# Patient Record
Sex: FEMALE | Race: WHITE | NOT HISPANIC OR LATINO | URBAN - NONMETROPOLITAN AREA
[De-identification: names, ages, dates, MRNs, and addresses within clinical notes are randomized per-mention and may not be internally consistent; named-entity substitution may affect disease eponyms.]

---

## 2021-01-25 ENCOUNTER — WEB ENCOUNTER (OUTPATIENT)
Dept: URBAN - NONMETROPOLITAN AREA CLINIC 2 | Facility: CLINIC | Age: 51
End: 2021-01-25

## 2021-01-25 ENCOUNTER — LAB OUTSIDE AN ENCOUNTER (OUTPATIENT)
Dept: URBAN - NONMETROPOLITAN AREA CLINIC 2 | Facility: CLINIC | Age: 51
End: 2021-01-25

## 2021-01-25 ENCOUNTER — OFFICE VISIT (OUTPATIENT)
Dept: URBAN - NONMETROPOLITAN AREA CLINIC 2 | Facility: CLINIC | Age: 51
End: 2021-01-25
Payer: MEDICARE

## 2021-01-25 DIAGNOSIS — R13.10 ESOPHAGEAL DYSPHAGIA: ICD-10-CM

## 2021-01-25 DIAGNOSIS — K59.00 CONSTIPATION, UNSPECIFIED CONSTIPATION TYPE: ICD-10-CM

## 2021-01-25 DIAGNOSIS — Z12.11 COLON CANCER SCREENING: ICD-10-CM

## 2021-01-25 DIAGNOSIS — K21.9 GERD WITHOUT ESOPHAGITIS: ICD-10-CM

## 2021-01-25 PROCEDURE — 3017F COLORECTAL CA SCREEN DOC REV: CPT | Performed by: NURSE PRACTITIONER

## 2021-01-25 PROCEDURE — G8420 CALC BMI NORM PARAMETERS: HCPCS | Performed by: NURSE PRACTITIONER

## 2021-01-25 PROCEDURE — 99204 OFFICE O/P NEW MOD 45 MIN: CPT | Performed by: NURSE PRACTITIONER

## 2021-01-25 PROCEDURE — G8427 DOCREV CUR MEDS BY ELIG CLIN: HCPCS | Performed by: NURSE PRACTITIONER

## 2021-01-25 RX ORDER — FAMOTIDINE 40 MG/1
1 TABLET AT BEDTIME AS NEEDED TABLET, FILM COATED ORAL ONCE A DAY
Status: ACTIVE | COMMUNITY

## 2021-01-25 RX ORDER — LAMOTRIGINE 25 MG/1
2 TABLET TABLET ORAL BID
Status: ACTIVE | COMMUNITY

## 2021-01-25 RX ORDER — GALCANEZUMAB 120 MG/ML
AS DIRECTED INJECTION, SOLUTION SUBCUTANEOUS
Status: ACTIVE | COMMUNITY

## 2021-01-25 RX ORDER — FLUOXETINE HYDROCHLORIDE 40 MG/1
TAKE 1 CAPSULE (40 MG) BY ORAL ROUTE 2 TIMES PER DAY IN THE MORNING AND AT NOON CAPSULE ORAL 2
Qty: 0 | Refills: 0 | Status: DISCONTINUED | COMMUNITY
Start: 1900-01-01

## 2021-01-25 RX ORDER — ALPRAZOLAM 2 MG/1
TAKE 1 TABLET (2 MG) BY ORAL ROUTE 2 TIMES PER DAY TABLET ORAL
Qty: 0 | Refills: 0 | Status: DISCONTINUED | COMMUNITY
Start: 1900-01-01

## 2021-01-25 RX ORDER — ATORVASTATIN CALCIUM 10 MG/1
1 TABLET TABLET, FILM COATED ORAL ONCE A DAY
Status: ACTIVE | COMMUNITY

## 2021-01-25 RX ORDER — LEVETIRACETAM 500 MG/1
1 TABLET TABLET, FILM COATED ORAL
Status: ACTIVE | COMMUNITY

## 2021-01-25 RX ORDER — VENLAFAXINE HCL 37.5 MG
TAKE 1 CAPSULE (37.5 MG) BY ORAL ROUTE ONCE DAILY WITH FOOD CAPSULE, EXT RELEASE 24 HR ORAL 1
Qty: 0 | Refills: 0 | Status: DISCONTINUED | COMMUNITY
Start: 1900-01-01

## 2021-01-25 RX ORDER — OMEPRAZOLE 40 MG/1
1 CAPSULE 30 MINUTES BEFORE MEALS CAPSULE, DELAYED RELEASE ORAL BID
Qty: 180 | Refills: 11 | OUTPATIENT
Start: 2021-01-25

## 2021-01-25 RX ORDER — ZOLPIDEM TARTRATE 10 MG/1
1 TABLET AT BEDTIME AS NEEDED TABLET, FILM COATED ORAL ONCE A DAY
Status: ACTIVE | COMMUNITY

## 2021-01-25 RX ORDER — LOSARTAN POTASSIUM 25 MG/1
TAKE 1 TABLET (25 MG) BY ORAL ROUTE ONCE DAILY TABLET, FILM COATED ORAL 1
Qty: 0 | Refills: 0 | Status: DISCONTINUED | COMMUNITY
Start: 1900-01-01

## 2021-01-25 RX ORDER — GABAPENTIN 300 MG/1
TAKE 1 CAPSULE (300 MG) BY ORAL ROUTE 3 TIMES PER DAY CAPSULE ORAL
Qty: 0 | Refills: 0 | Status: DISCONTINUED | COMMUNITY
Start: 1900-01-01

## 2021-01-25 RX ORDER — ZONISAMIDE 25 MG/1
3 CAPSULE CAPSULE ORAL QHS
Status: ACTIVE | COMMUNITY

## 2021-01-25 RX ORDER — CLONAZEPAM 1 MG/1
TAKE 1 TABLET (1 MG) BY ORAL ROUTE 2 TIMES PER DAY(1 OR 2) TABLET ORAL 2
Qty: 0 | Refills: 0 | Status: ACTIVE | COMMUNITY
Start: 1900-01-01

## 2021-01-25 NOTE — HPI-TODAY'S VISIT:
1/25/2021 Ms. Mishel Baca is a 50 year old female here for dysphagia. She was last seen in 2016 for constipation with a normal colonoscopy. She was given linzess at the time. She was unable to get this due to cost. She has been taking stool softners with continued symptoms. She has tried all OTC meds which either do not work or cause cramping. Today, she is complaining of dysphagia. This is been occuring for the last 6 months. She will have issues with pills, solids and liquids. She is able to clear the bolus with more liquid and time. She has never had an EGD- but reports a hx of bleeding ulcers in her 20's.. She has been having a lot of reflux which she atrributes to her weight gain. SHe takes tums and pepcid with continued symptoms.  CS

## 2021-02-22 ENCOUNTER — CLAIMS CREATED FROM THE CLAIM WINDOW (OUTPATIENT)
Dept: URBAN - METROPOLITAN AREA CLINIC 4 | Facility: CLINIC | Age: 51
End: 2021-02-22
Payer: MEDICARE

## 2021-02-22 ENCOUNTER — OFFICE VISIT (OUTPATIENT)
Dept: URBAN - NONMETROPOLITAN AREA SURGERY CENTER 1 | Facility: SURGERY CENTER | Age: 51
End: 2021-02-22
Payer: MEDICARE

## 2021-02-22 DIAGNOSIS — K21.00 GASTRO-ESOPHAGEAL REFLUX DISEASE WITH ESOPHAGITIS, WITHOUT BLEEDING: ICD-10-CM

## 2021-02-22 DIAGNOSIS — B96.81 BACTERIAL INFECTION DUE TO H. PYLORI: ICD-10-CM

## 2021-02-22 DIAGNOSIS — K29.60 OTHER GASTRITIS WITHOUT BLEEDING: ICD-10-CM

## 2021-02-22 DIAGNOSIS — K21.9 ACID REFLUX: ICD-10-CM

## 2021-02-22 DIAGNOSIS — B96.81 HELICOBACTER PYLORI [H. PYLORI] AS THE CAUSE OF DISEASES CLASSIFIED ELSEWHERE: ICD-10-CM

## 2021-02-22 DIAGNOSIS — K29.60 ADENOPAPILLOMATOSIS GASTRICA: ICD-10-CM

## 2021-02-22 PROCEDURE — G8907 PT DOC NO EVENTS ON DISCHARG: HCPCS | Performed by: INTERNAL MEDICINE

## 2021-02-22 PROCEDURE — 88312 SPECIAL STAINS GROUP 1: CPT | Performed by: PATHOLOGY

## 2021-02-22 PROCEDURE — 43239 EGD BIOPSY SINGLE/MULTIPLE: CPT | Performed by: INTERNAL MEDICINE

## 2021-02-22 PROCEDURE — 88342 IMHCHEM/IMCYTCHM 1ST ANTB: CPT | Performed by: PATHOLOGY

## 2021-02-22 PROCEDURE — 88305 TISSUE EXAM BY PATHOLOGIST: CPT | Performed by: PATHOLOGY

## 2021-03-15 ENCOUNTER — OFFICE VISIT (OUTPATIENT)
Dept: URBAN - NONMETROPOLITAN AREA CLINIC 2 | Facility: CLINIC | Age: 51
End: 2021-03-15
Payer: MEDICARE

## 2021-03-15 VITALS
BODY MASS INDEX: 40.02 KG/M2 | DIASTOLIC BLOOD PRESSURE: 82 MMHG | SYSTOLIC BLOOD PRESSURE: 123 MMHG | HEART RATE: 67 BPM | TEMPERATURE: 97.7 F | HEIGHT: 66 IN | WEIGHT: 249 LBS

## 2021-03-15 DIAGNOSIS — R13.10 ESOPHAGEAL DYSPHAGIA: ICD-10-CM

## 2021-03-15 DIAGNOSIS — K59.00 CONSTIPATION, UNSPECIFIED CONSTIPATION TYPE: ICD-10-CM

## 2021-03-15 DIAGNOSIS — A04.8 H. PYLORI INFECTION: ICD-10-CM

## 2021-03-15 DIAGNOSIS — Z12.11 COLON CANCER SCREENING: ICD-10-CM

## 2021-03-15 DIAGNOSIS — K21.9 GERD WITHOUT ESOPHAGITIS: ICD-10-CM

## 2021-03-15 PROCEDURE — 99213 OFFICE O/P EST LOW 20 MIN: CPT | Performed by: NURSE PRACTITIONER

## 2021-03-15 RX ORDER — LAMOTRIGINE 25 MG/1
2 TABLET TABLET ORAL BID
Status: ACTIVE | COMMUNITY

## 2021-03-15 RX ORDER — LEVETIRACETAM 500 MG/1
1 TABLET TABLET, FILM COATED ORAL
Status: ACTIVE | COMMUNITY

## 2021-03-15 RX ORDER — ZOLPIDEM TARTRATE 10 MG/1
1 TABLET AT BEDTIME AS NEEDED TABLET, FILM COATED ORAL ONCE A DAY
Status: ACTIVE | COMMUNITY

## 2021-03-15 RX ORDER — CLONAZEPAM 1 MG/1
TAKE 1 TABLET (1 MG) BY ORAL ROUTE 2 TIMES PER DAY(1 OR 2) TABLET ORAL 2
Qty: 0 | Refills: 0 | Status: ACTIVE | COMMUNITY
Start: 1900-01-01

## 2021-03-15 RX ORDER — GALCANEZUMAB 120 MG/ML
AS DIRECTED INJECTION, SOLUTION SUBCUTANEOUS
Status: ACTIVE | COMMUNITY

## 2021-03-15 RX ORDER — PLECANATIDE 3 MG/1
1 TABLET TABLET ORAL ONCE A DAY
Qty: 90 | Refills: 3 | OUTPATIENT
Start: 2021-03-15 | End: 2022-03-09

## 2021-03-15 RX ORDER — ATORVASTATIN CALCIUM 10 MG/1
1 TABLET TABLET, FILM COATED ORAL ONCE A DAY
Status: ACTIVE | COMMUNITY

## 2021-03-15 RX ORDER — ZONISAMIDE 25 MG/1
3 CAPSULE CAPSULE ORAL QHS
Status: ACTIVE | COMMUNITY

## 2021-03-15 RX ORDER — OMEPRAZOLE 40 MG/1
1 CAPSULE 30 MINUTES BEFORE MEALS CAPSULE, DELAYED RELEASE ORAL BID
Qty: 180 | Refills: 11 | Status: ACTIVE | COMMUNITY
Start: 2021-01-25

## 2021-03-15 RX ORDER — OMEPRAZOLE 40 MG/1
1 CAPSULE 30 MINUTES BEFORE MEALS CAPSULE, DELAYED RELEASE ORAL BID
Qty: 180 | Refills: 11 | OUTPATIENT

## 2021-03-15 RX ORDER — FAMOTIDINE 40 MG/1
1 TABLET AT BEDTIME AS NEEDED TABLET, FILM COATED ORAL ONCE A DAY
Status: ACTIVE | COMMUNITY

## 2021-03-15 NOTE — HPI-TODAY'S VISIT:
3/15/2021 Ms. Mishel Baca is her for f/u of dysphagia, GERD, and constipation. She was having a lot of trouble swallowing pills, liquids, and solids at her last OV and her reflux was not controlled with tums and pepcid. She was started on omeprazole 40mg BID and had an EGD. This showed a tortuous esophagus and gastritis. Path positive for hpylori. She has only had mild improvement with omeprazole.  She was given trulance samples for constipation at her last. This worked on day one and then stopped. She admits to not taking them daily. She is currently not taking anything and going a week inbetween . CS

## 2021-03-16 ENCOUNTER — TELEPHONE ENCOUNTER (OUTPATIENT)
Dept: URBAN - NONMETROPOLITAN AREA CLINIC 2 | Facility: CLINIC | Age: 51
End: 2021-03-16

## 2021-03-16 RX ORDER — GALCANEZUMAB 120 MG/ML
AS DIRECTED INJECTION, SOLUTION SUBCUTANEOUS
Status: ACTIVE | COMMUNITY

## 2021-03-16 RX ORDER — ATORVASTATIN CALCIUM 10 MG/1
1 TABLET TABLET, FILM COATED ORAL ONCE A DAY
Status: ACTIVE | COMMUNITY

## 2021-03-16 RX ORDER — OMEPRAZOLE 40 MG/1
1 CAPSULE 30 MINUTES BEFORE MEALS CAPSULE, DELAYED RELEASE ORAL BID
Qty: 180 | Refills: 11 | Status: ACTIVE | COMMUNITY

## 2021-03-16 RX ORDER — LAMOTRIGINE 25 MG/1
2 TABLET TABLET ORAL BID
Status: ACTIVE | COMMUNITY

## 2021-03-16 RX ORDER — ZONISAMIDE 25 MG/1
3 CAPSULE CAPSULE ORAL QHS
Status: ACTIVE | COMMUNITY

## 2021-03-16 RX ORDER — ZOLPIDEM TARTRATE 10 MG/1
1 TABLET AT BEDTIME AS NEEDED TABLET, FILM COATED ORAL ONCE A DAY
Status: ACTIVE | COMMUNITY

## 2021-03-16 RX ORDER — PLECANATIDE 3 MG/1
1 TABLET TABLET ORAL ONCE A DAY
Qty: 90 | Refills: 3 | Status: ACTIVE | COMMUNITY
Start: 2021-03-15 | End: 2022-03-09

## 2021-03-16 RX ORDER — BISMUTH SUBCITRATE POTASSIUM, METRONIDAZOLE, TETRACYCLINE HYDROCHLORIDE 140; 125; 125 MG/1; MG/1; MG/1
3 CAPSULES AFTER MEALS AND AT BEDTIME CAPSULE ORAL
Qty: 120 | OUTPATIENT
Start: 2021-03-16 | End: 2021-03-26

## 2021-03-16 RX ORDER — LEVETIRACETAM 500 MG/1
1 TABLET TABLET, FILM COATED ORAL
Status: ACTIVE | COMMUNITY

## 2021-03-16 RX ORDER — FAMOTIDINE 40 MG/1
1 TABLET AT BEDTIME AS NEEDED TABLET, FILM COATED ORAL ONCE A DAY
Status: ACTIVE | COMMUNITY

## 2021-03-16 RX ORDER — CLONAZEPAM 1 MG/1
TAKE 1 TABLET (1 MG) BY ORAL ROUTE 2 TIMES PER DAY(1 OR 2) TABLET ORAL 2
Qty: 0 | Refills: 0 | Status: ACTIVE | COMMUNITY
Start: 1900-01-01

## 2021-03-18 ENCOUNTER — TELEPHONE ENCOUNTER (OUTPATIENT)
Dept: URBAN - METROPOLITAN AREA CLINIC 23 | Facility: CLINIC | Age: 51
End: 2021-03-18

## 2021-03-18 ENCOUNTER — TELEPHONE ENCOUNTER (OUTPATIENT)
Dept: URBAN - NONMETROPOLITAN AREA CLINIC 2 | Facility: CLINIC | Age: 51
End: 2021-03-18

## 2021-03-18 RX ORDER — LEVETIRACETAM 500 MG/1
1 TABLET TABLET, FILM COATED ORAL
Status: ACTIVE | COMMUNITY

## 2021-03-18 RX ORDER — BISMUTH SUBSALICYLATE 262 MG/1
2 TABLETS WITH MEALS AND AT BEDTIME TABLET, CHEWABLE ORAL
Qty: 112 TABLET | Refills: 0 | OUTPATIENT
Start: 2021-03-18 | End: 2021-04-01

## 2021-03-18 RX ORDER — METRONIDAZOLE 250 MG/1
1 TABLET TABLET ORAL QID
Qty: 56 TABLET | Refills: 0 | OUTPATIENT
Start: 2021-03-18 | End: 2021-04-01

## 2021-03-18 RX ORDER — PLECANATIDE 3 MG/1
1 TABLET TABLET ORAL ONCE A DAY
Qty: 90 | Refills: 3 | Status: ACTIVE | COMMUNITY
Start: 2021-03-15 | End: 2022-03-09

## 2021-03-18 RX ORDER — ZONISAMIDE 25 MG/1
3 CAPSULE CAPSULE ORAL QHS
Status: ACTIVE | COMMUNITY

## 2021-03-18 RX ORDER — LAMOTRIGINE 25 MG/1
2 TABLET TABLET ORAL BID
Status: ACTIVE | COMMUNITY

## 2021-03-18 RX ORDER — ATORVASTATIN CALCIUM 10 MG/1
1 TABLET TABLET, FILM COATED ORAL ONCE A DAY
Status: ACTIVE | COMMUNITY

## 2021-03-18 RX ORDER — FAMOTIDINE 40 MG/1
1 TABLET AT BEDTIME AS NEEDED TABLET, FILM COATED ORAL ONCE A DAY
Status: ACTIVE | COMMUNITY

## 2021-03-18 RX ORDER — OMEPRAZOLE 40 MG/1
1 CAPSULE 30 MINUTES BEFORE MEALS CAPSULE, DELAYED RELEASE ORAL BID
Qty: 180 | Refills: 11 | Status: ACTIVE | COMMUNITY

## 2021-03-18 RX ORDER — GALCANEZUMAB 120 MG/ML
AS DIRECTED INJECTION, SOLUTION SUBCUTANEOUS
Status: ACTIVE | COMMUNITY

## 2021-03-18 RX ORDER — CLONAZEPAM 1 MG/1
TAKE 1 TABLET (1 MG) BY ORAL ROUTE 2 TIMES PER DAY(1 OR 2) TABLET ORAL 2
Qty: 0 | Refills: 0 | Status: ACTIVE | COMMUNITY
Start: 1900-01-01

## 2021-03-18 RX ORDER — ZOLPIDEM TARTRATE 10 MG/1
1 TABLET AT BEDTIME AS NEEDED TABLET, FILM COATED ORAL ONCE A DAY
Status: ACTIVE | COMMUNITY

## 2021-03-18 RX ORDER — BISMUTH SUBCITRATE POTASSIUM, METRONIDAZOLE, TETRACYCLINE HYDROCHLORIDE 140; 125; 125 MG/1; MG/1; MG/1
3 CAPSULES AFTER MEALS AND AT BEDTIME CAPSULE ORAL
Qty: 120 | Status: ACTIVE | COMMUNITY
Start: 2021-03-16 | End: 2021-03-26

## 2021-03-18 RX ORDER — TETRACYCLINE HYDROCHLORIDE 500 MG/1
1 CAPSULE ON AN EMPTY STOMACH CAPSULE ORAL QID
OUTPATIENT
Start: 2021-03-18 | End: 2021-04-01

## 2021-03-18 RX ORDER — TETRACYCLINE HYDROCHLORIDE 500 MG/1
1 CAPSULE ON AN EMPTY STOMACH CAPSULE ORAL QID
Qty: 56 CAPSULE | Refills: 0 | OUTPATIENT
Start: 2021-03-18 | End: 2021-04-01

## 2021-03-22 ENCOUNTER — WEB ENCOUNTER (OUTPATIENT)
Dept: URBAN - NONMETROPOLITAN AREA CLINIC 2 | Facility: CLINIC | Age: 51
End: 2021-03-22

## 2021-03-22 RX ORDER — BISMUTH SUBSALICYLATE 262 MG/1
2 TABLETS WITH MEALS AND AT BEDTIME TABLET, CHEWABLE ORAL
Qty: 112 TABLET | Refills: 0 | Status: ACTIVE | COMMUNITY
Start: 2021-03-18 | End: 2021-04-01

## 2021-03-22 RX ORDER — CLONAZEPAM 1 MG/1
TAKE 1 TABLET (1 MG) BY ORAL ROUTE 2 TIMES PER DAY(1 OR 2) TABLET ORAL 2
Qty: 0 | Refills: 0 | Status: ACTIVE | COMMUNITY
Start: 1900-01-01

## 2021-03-22 RX ORDER — FAMOTIDINE 40 MG/1
1 TABLET AT BEDTIME AS NEEDED TABLET, FILM COATED ORAL ONCE A DAY
Status: ACTIVE | COMMUNITY

## 2021-03-22 RX ORDER — DOXYCYCLINE HYCLATE 100 MG/1
1 TABLET CAPSULE, GELATIN COATED ORAL TWICE A DAY
Qty: 28 TABLET | Refills: 0 | OUTPATIENT
Start: 2021-03-23 | End: 2021-04-06

## 2021-03-22 RX ORDER — METRONIDAZOLE 250 MG/1
1 TABLET TABLET ORAL QID
Qty: 56 TABLET | Refills: 0 | Status: ACTIVE | COMMUNITY
Start: 2021-03-18 | End: 2021-04-01

## 2021-03-22 RX ORDER — PLECANATIDE 3 MG/1
1 TABLET TABLET ORAL ONCE A DAY
Qty: 90 | Refills: 3 | Status: ACTIVE | COMMUNITY
Start: 2021-03-15 | End: 2022-03-09

## 2021-03-22 RX ORDER — LAMOTRIGINE 25 MG/1
2 TABLET TABLET ORAL BID
Status: ACTIVE | COMMUNITY

## 2021-03-22 RX ORDER — BISMUTH SUBCITRATE POTASSIUM, METRONIDAZOLE, TETRACYCLINE HYDROCHLORIDE 140; 125; 125 MG/1; MG/1; MG/1
3 CAPSULES AFTER MEALS AND AT BEDTIME CAPSULE ORAL
Qty: 120 | Status: ACTIVE | COMMUNITY
Start: 2021-03-16 | End: 2021-03-26

## 2021-03-22 RX ORDER — OMEPRAZOLE 40 MG/1
1 CAPSULE 30 MINUTES BEFORE MEALS CAPSULE, DELAYED RELEASE ORAL BID
Qty: 180 | Refills: 11 | Status: ACTIVE | COMMUNITY

## 2021-03-22 RX ORDER — LEVETIRACETAM 500 MG/1
1 TABLET TABLET, FILM COATED ORAL
Status: ACTIVE | COMMUNITY

## 2021-03-22 RX ORDER — ZOLPIDEM TARTRATE 10 MG/1
1 TABLET AT BEDTIME AS NEEDED TABLET, FILM COATED ORAL ONCE A DAY
Status: ACTIVE | COMMUNITY

## 2021-03-22 RX ORDER — TETRACYCLINE HYDROCHLORIDE 500 MG/1
1 CAPSULE ON AN EMPTY STOMACH CAPSULE ORAL QID
Status: ACTIVE | COMMUNITY
Start: 2021-03-18 | End: 2021-04-01

## 2021-03-22 RX ORDER — GALCANEZUMAB 120 MG/ML
AS DIRECTED INJECTION, SOLUTION SUBCUTANEOUS
Status: ACTIVE | COMMUNITY

## 2021-03-22 RX ORDER — ZONISAMIDE 25 MG/1
3 CAPSULE CAPSULE ORAL QHS
Status: ACTIVE | COMMUNITY

## 2021-03-22 RX ORDER — ATORVASTATIN CALCIUM 10 MG/1
1 TABLET TABLET, FILM COATED ORAL ONCE A DAY
Status: ACTIVE | COMMUNITY

## 2021-04-21 ENCOUNTER — OFFICE VISIT (OUTPATIENT)
Dept: URBAN - NONMETROPOLITAN AREA CLINIC 2 | Facility: CLINIC | Age: 51
End: 2021-04-21
Payer: MEDICARE

## 2021-04-21 VITALS
DIASTOLIC BLOOD PRESSURE: 85 MMHG | BODY MASS INDEX: 38.73 KG/M2 | SYSTOLIC BLOOD PRESSURE: 127 MMHG | WEIGHT: 241 LBS | HEIGHT: 66 IN | HEART RATE: 83 BPM | TEMPERATURE: 97.2 F

## 2021-04-21 DIAGNOSIS — K21.9 GERD WITHOUT ESOPHAGITIS: ICD-10-CM

## 2021-04-21 DIAGNOSIS — Z12.11 COLON CANCER SCREENING: ICD-10-CM

## 2021-04-21 DIAGNOSIS — R13.10 ESOPHAGEAL DYSPHAGIA: ICD-10-CM

## 2021-04-21 DIAGNOSIS — K59.00 CONSTIPATION, UNSPECIFIED CONSTIPATION TYPE: ICD-10-CM

## 2021-04-21 DIAGNOSIS — A04.8 H. PYLORI INFECTION: ICD-10-CM

## 2021-04-21 PROCEDURE — 99214 OFFICE O/P EST MOD 30 MIN: CPT | Performed by: NURSE PRACTITIONER

## 2021-04-21 RX ORDER — PLECANATIDE 3 MG/1
1 TABLET TABLET ORAL ONCE A DAY
Qty: 90 | Refills: 3 | OUTPATIENT

## 2021-04-21 RX ORDER — OMEPRAZOLE 40 MG/1
1 CAPSULE 30 MINUTES BEFORE MEALS CAPSULE, DELAYED RELEASE ORAL BID
Qty: 180 | Refills: 11 | OUTPATIENT

## 2021-04-21 RX ORDER — LACTULOSE 10 G/15ML
45 ML SOLUTION ORAL TID
Qty: 4050 ML | Refills: 6 | OUTPATIENT
Start: 2021-04-21 | End: 2021-11-16

## 2021-04-21 RX ORDER — LAMOTRIGINE 25 MG/1
2 TABLET TABLET ORAL BID
Status: ACTIVE | COMMUNITY

## 2021-04-21 RX ORDER — PLECANATIDE 3 MG/1
1 TABLET TABLET ORAL ONCE A DAY
Qty: 90 | Refills: 3 | Status: ACTIVE | COMMUNITY
Start: 2021-03-15 | End: 2022-03-09

## 2021-04-21 RX ORDER — CLONAZEPAM 1 MG/1
TAKE 1 TABLET (1 MG) BY ORAL ROUTE 2 TIMES PER DAY(1 OR 2) TABLET ORAL 2
Qty: 0 | Refills: 0 | Status: ACTIVE | COMMUNITY
Start: 1900-01-01

## 2021-04-21 RX ORDER — GALCANEZUMAB 120 MG/ML
AS DIRECTED INJECTION, SOLUTION SUBCUTANEOUS
Status: ACTIVE | COMMUNITY

## 2021-04-21 RX ORDER — ZONISAMIDE 25 MG/1
3 CAPSULE CAPSULE ORAL QHS
Status: ACTIVE | COMMUNITY

## 2021-04-21 RX ORDER — ZOLPIDEM TARTRATE 10 MG/1
1 TABLET AT BEDTIME AS NEEDED TABLET, FILM COATED ORAL ONCE A DAY
Status: ACTIVE | COMMUNITY

## 2021-04-21 RX ORDER — FAMOTIDINE 40 MG/1
1 TABLET AT BEDTIME AS NEEDED TABLET, FILM COATED ORAL ONCE A DAY
Status: ACTIVE | COMMUNITY

## 2021-04-21 RX ORDER — OMEPRAZOLE 40 MG/1
1 CAPSULE 30 MINUTES BEFORE MEALS CAPSULE, DELAYED RELEASE ORAL BID
Qty: 180 | Refills: 11 | Status: ACTIVE | COMMUNITY

## 2021-04-21 RX ORDER — LEVETIRACETAM 500 MG/1
1 TABLET TABLET, FILM COATED ORAL
Status: ACTIVE | COMMUNITY

## 2021-04-21 RX ORDER — ATORVASTATIN CALCIUM 10 MG/1
1 TABLET TABLET, FILM COATED ORAL ONCE A DAY
Status: ACTIVE | COMMUNITY

## 2021-04-21 NOTE — HPI-TODAY'S VISIT:
4/21/2021 Ms. Mishel Baca is her for f/u of dysphagia, GERD, and constipation. She has been on omeprazole 40mg BID and had an EGD with a tortuous esophagus and gastritis. Path positive for hpylori. She was given prevpak at her last OV. She completed this with nasuea as a SE. She remains on BID omeprazole. She is still having some epigastric pain and dysphagia but this is improving.  She has been started on trulance. This works most of the time but can go days between BM. CS

## 2021-04-21 NOTE — HPI-OTHER HISTORIES
1/25/2021 Ms. Mishel Baca is a 50 year old female here for dysphagia. She was last seen in 2016 for constipation with a normal colonoscopy. She was given linzess at the time. She was unable to get this due to cost. She has been taking stool softners with continued symptoms. She has tried all OTC meds which either do not work or cause cramping. Today, she is complaining of dysphagia. This is been occuring for the last 6 months. She will have issues with pills, solids and liquids. She is able to clear the bolus with more liquid and time. She has never had an EGD- but reports a hx of bleeding ulcers in her 20's.. She has been having a lot of reflux which she atrributes to her weight gain. SHe takes tums and pepcid with continued symptoms.  CS   3/15/2021 Ms. Mishel Baca is her for f/u of dysphagia, GERD, and constipation. She was having a lot of trouble swallowing pills, liquids, and solids at her last OV and her reflux was not controlled with tums and pepcid. She was started on omeprazole 40mg BID and had an EGD. This showed a tortuous esophagus and gastritis. Path positive for hpylori. She has only had mild improvement with omeprazole.  She was given trulance samples for constipation at her last. This worked on day one and then stopped. She admits to not taking them daily. She is currently not taking anything and going a week inbetween . CS

## 2021-05-03 LAB — H. PYLORI STOOL AG, EIA: NEGATIVE

## 2021-07-21 ENCOUNTER — OFFICE VISIT (OUTPATIENT)
Dept: URBAN - NONMETROPOLITAN AREA CLINIC 2 | Facility: CLINIC | Age: 51
End: 2021-07-21
Payer: MEDICARE

## 2021-07-21 ENCOUNTER — WEB ENCOUNTER (OUTPATIENT)
Dept: URBAN - NONMETROPOLITAN AREA CLINIC 2 | Facility: CLINIC | Age: 51
End: 2021-07-21

## 2021-07-21 ENCOUNTER — DASHBOARD ENCOUNTERS (OUTPATIENT)
Age: 51
End: 2021-07-21

## 2021-07-21 VITALS
DIASTOLIC BLOOD PRESSURE: 87 MMHG | HEART RATE: 87 BPM | BODY MASS INDEX: 44.56 KG/M2 | SYSTOLIC BLOOD PRESSURE: 133 MMHG | TEMPERATURE: 97.8 F | HEIGHT: 66 IN | WEIGHT: 277.3 LBS

## 2021-07-21 DIAGNOSIS — K21.9 GERD WITHOUT ESOPHAGITIS: ICD-10-CM

## 2021-07-21 DIAGNOSIS — Z12.11 COLON CANCER SCREENING: ICD-10-CM

## 2021-07-21 DIAGNOSIS — R13.19 OTHER DYSPHAGIA: ICD-10-CM

## 2021-07-21 DIAGNOSIS — A04.8 H. PYLORI INFECTION: ICD-10-CM

## 2021-07-21 DIAGNOSIS — K59.09 OTHER CONSTIPATION: ICD-10-CM

## 2021-07-21 PROBLEM — 14760008: Status: ACTIVE | Noted: 2021-01-25

## 2021-07-21 PROBLEM — 266435005: Status: ACTIVE | Noted: 2021-01-25

## 2021-07-21 PROBLEM — 40890009: Status: ACTIVE | Noted: 2021-01-25

## 2021-07-21 PROCEDURE — 99213 OFFICE O/P EST LOW 20 MIN: CPT | Performed by: INTERNAL MEDICINE

## 2021-07-21 RX ORDER — ZONISAMIDE 25 MG/1
3 CAPSULE CAPSULE ORAL QHS
Status: ACTIVE | COMMUNITY

## 2021-07-21 RX ORDER — LACTULOSE 10 G/15ML
45 ML SOLUTION ORAL TID
Qty: 4050 ML | Refills: 6 | Status: ACTIVE | COMMUNITY
Start: 2021-04-21 | End: 2021-11-16

## 2021-07-21 RX ORDER — ZOLPIDEM TARTRATE 10 MG/1
1 TABLET AT BEDTIME AS NEEDED TABLET, FILM COATED ORAL ONCE A DAY
Status: ACTIVE | COMMUNITY

## 2021-07-21 RX ORDER — LEVETIRACETAM 500 MG/1
1 TABLET TABLET, FILM COATED ORAL
Status: ACTIVE | COMMUNITY

## 2021-07-21 RX ORDER — CLONAZEPAM 1 MG/1
TAKE 1 TABLET (1 MG) BY ORAL ROUTE 2 TIMES PER DAY(1 OR 2) TABLET ORAL 2
Qty: 0 | Refills: 0 | Status: ACTIVE | COMMUNITY
Start: 1900-01-01

## 2021-07-21 RX ORDER — LAMOTRIGINE 25 MG/1
2 TABLET TABLET ORAL BID
Status: ACTIVE | COMMUNITY

## 2021-07-21 RX ORDER — LACTULOSE 10 G/15ML
45 ML SOLUTION ORAL TID
Qty: 4050 ML | Refills: 6 | OUTPATIENT

## 2021-07-21 RX ORDER — OMEPRAZOLE 40 MG/1
1 CAPSULE 30 MINUTES BEFORE MEALS CAPSULE, DELAYED RELEASE ORAL BID
Qty: 180 | Refills: 11 | Status: ACTIVE | COMMUNITY

## 2021-07-21 RX ORDER — FAMOTIDINE 40 MG/1
1 TABLET AT BEDTIME AS NEEDED TABLET, FILM COATED ORAL ONCE A DAY
Status: ACTIVE | COMMUNITY

## 2021-07-21 RX ORDER — OMEPRAZOLE 40 MG/1
1 CAPSULE 30 MINUTES BEFORE MEALS CAPSULE, DELAYED RELEASE ORAL BID
Qty: 180 | Refills: 11 | OUTPATIENT

## 2021-07-21 RX ORDER — PLECANATIDE 3 MG/1
1 TABLET TABLET ORAL ONCE A DAY
Qty: 90 | Refills: 3 | Status: ACTIVE | COMMUNITY

## 2021-07-21 RX ORDER — ATORVASTATIN CALCIUM 10 MG/1
1 TABLET TABLET, FILM COATED ORAL ONCE A DAY
Status: ACTIVE | COMMUNITY

## 2021-07-21 RX ORDER — HYOSCYAMINE SULFATE 0.12 MG/1
1 TABLET UNDER THE TONGUE AND ALLOW TO DISSOLVE EVERY 4-6 HRS  AS NEEDED FOR ESOPHAGEAL SPASM TABLET, ORALLY DISINTEGRATING ORAL
Qty: 90 | Refills: 3 | OUTPATIENT
Start: 2021-07-21 | End: 2021-11-17

## 2021-07-21 RX ORDER — PLECANATIDE 3 MG/1
1 TABLET TABLET ORAL ONCE A DAY
Qty: 90 | Refills: 3 | OUTPATIENT

## 2021-07-21 RX ORDER — GALCANEZUMAB 120 MG/ML
AS DIRECTED INJECTION, SOLUTION SUBCUTANEOUS
Status: ACTIVE | COMMUNITY

## 2021-07-21 NOTE — HPI-OTHER HISTORIES
1/25/2021 Ms. Mishel Baca is a 50 year old female here for dysphagia. She was last seen in 2016 for constipation with a normal colonoscopy. She was given linzess at the time. She was unable to get this due to cost. She has been taking stool softners with continued symptoms. She has tried all OTC meds which either do not work or cause cramping. Today, she is complaining of dysphagia. This is been occuring for the last 6 months. She will have issues with pills, solids and liquids. She is able to clear the bolus with more liquid and time. She has never had an EGD- but reports a hx of bleeding ulcers in her 20's.. She has been having a lot of reflux which she atrributes to her weight gain. SHe takes tums and pepcid with continued symptoms.  CS   3/15/2021 Ms. Mishel Baca is her for f/u of dysphagia, GERD, and constipation. She was having a lot of trouble swallowing pills, liquids, and solids at her last OV and her reflux was not controlled with tums and pepcid. She was started on omeprazole 40mg BID and had an EGD. This showed a tortuous esophagus and gastritis. Path positive for hpylori. She has only had mild improvement with omeprazole.  She was given trulance samples for constipation at her last. This worked on day one and then stopped. She admits to not taking them daily. She is currently not taking anything and going a week inbetween BM. CS   4/21/2021 Ms. Mishel Baca is her for f/u of dysphagia, GERD, and constipation. She has been on omeprazole 40mg BID and had an EGD with a tortuous esophagus and gastritis. Path positive for hpylori. She was given prevpak at her last OV. She completed this with nasuea as a SE. She remains on BID omeprazole. She is still having some epigastric pain and dysphagia but this is improving.  She has been started on trulance. This works most of the time but can go days between BM. CS

## 2021-07-21 NOTE — HPI-TODAY'S VISIT:
7/21/2021 Ms. Mishel Baca is her for f/u of dysphagia, GERD, and constipation. She has been on omeprazole 40mg BID and had an EGD with a tortuous esophagus and gastritis. Path positive for hpylori. She was treated with prevpak and proved eradication. She was still having some mild reflux at her last OV. This is improved today. She continues to have some dysphagia.  At her last OV, trulance worked most of the time but could go days between BM. Lactulose prn was added. This caused her to be incontinent it worked so well. She is now only on the trulance and working fairly well. She is wanting to see a surgeon about weight loss surgery. She is hoping to see them soon.  CS

## 2022-01-19 ENCOUNTER — OFFICE VISIT (OUTPATIENT)
Dept: URBAN - NONMETROPOLITAN AREA CLINIC 2 | Facility: CLINIC | Age: 52
End: 2022-01-19

## 2024-01-31 NOTE — PHYSICAL EXAM LYMPHATIC:
Neck , no lymphadenopathy New patient here today for annual exam, to establish care and for prolapsed uterus. Patient was a previous patient of Dr. Aguilar and has not seen a gyno or had a mammogram for about 2 years.  Discharge instructions have been discussed with the patient. Patient advised to call our office with any questions or concerns.   Voiced understanding.